# Patient Record
Sex: MALE | Race: WHITE | NOT HISPANIC OR LATINO | Employment: STUDENT | URBAN - METROPOLITAN AREA
[De-identification: names, ages, dates, MRNs, and addresses within clinical notes are randomized per-mention and may not be internally consistent; named-entity substitution may affect disease eponyms.]

---
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
## 2025-05-03 ENCOUNTER — HOSPITAL ENCOUNTER (EMERGENCY)
Facility: HOSPITAL | Age: 20
Discharge: HOME | End: 2025-05-03
Attending: EMERGENCY MEDICINE
Payer: COMMERCIAL

## 2025-05-03 ENCOUNTER — APPOINTMENT (OUTPATIENT)
Dept: GENERAL RADIOLOGY | Facility: HOSPITAL | Age: 20
End: 2025-05-03
Payer: COMMERCIAL

## 2025-05-03 VITALS
TEMPERATURE: 98.2 F | DIASTOLIC BLOOD PRESSURE: 65 MMHG | SYSTOLIC BLOOD PRESSURE: 123 MMHG | OXYGEN SATURATION: 99 % | HEART RATE: 67 BPM | RESPIRATION RATE: 18 BRPM

## 2025-05-03 DIAGNOSIS — S42.452A CLOSED FRACTURE OF CAPITELLUM OF DISTAL HUMERUS, LEFT, INITIAL ENCOUNTER: Primary | ICD-10-CM

## 2025-05-03 PROCEDURE — 99283 EMERGENCY DEPT VISIT LOW MDM: CPT

## 2025-05-03 PROCEDURE — 73080 X-RAY EXAM OF ELBOW: CPT

## 2025-05-03 PROCEDURE — 73110 X-RAY EXAM OF WRIST: CPT

## 2025-05-03 RX ORDER — IBUPROFEN 400 MG/1
800 TABLET, FILM COATED ORAL ONCE
Status: COMPLETED | OUTPATIENT
Start: 2025-05-03 | End: 2025-05-03

## 2025-05-03 RX ORDER — CYCLOBENZAPRINE HCL 10 MG
10 TABLET ORAL ONCE
Status: COMPLETED | OUTPATIENT
Start: 2025-05-03 | End: 2025-05-03

## 2025-05-03 RX ORDER — HYDROCODONE BITARTRATE AND ACETAMINOPHEN 5; 325 MG/1; MG/1
1 TABLET ORAL EVERY 6 HOURS PRN
Qty: 12 TABLET | Refills: 0 | Status: SHIPPED | OUTPATIENT
Start: 2025-05-03 | End: 2025-05-03

## 2025-05-03 RX ORDER — CYCLOBENZAPRINE HCL 10 MG
10 TABLET ORAL 3 TIMES DAILY
Qty: 20 TABLET | Refills: 0 | Status: SHIPPED | OUTPATIENT
Start: 2025-05-03

## 2025-05-03 RX ORDER — HYDROCODONE BITARTRATE AND ACETAMINOPHEN 5; 325 MG/1; MG/1
1 TABLET ORAL EVERY 6 HOURS PRN
Qty: 12 TABLET | Refills: 0 | Status: SHIPPED | OUTPATIENT
Start: 2025-05-03

## 2025-05-03 RX ADMIN — CYCLOBENZAPRINE HYDROCHLORIDE 10 MG: 10 TABLET, FILM COATED ORAL at 15:36

## 2025-05-03 RX ADMIN — IBUPROFEN 800 MG: 400 TABLET ORAL at 15:36
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
## 2025-05-03 NOTE — DISCHARGE INSTRUCTIONS
Your x-ray shows that you do have a fracture of your distal humerus.  Please keep your temporary splint clean and dry until you follow-up with Ortho.  Their office will call you to schedule follow-up appointment.  I have sent muscle relaxers and hydrocodone to the pharmacy.  You can take ibuprofen in addition.

## 2025-05-07 ENCOUNTER — TELEPHONE (OUTPATIENT)
Dept: ORTHOPEDIC SURGERY | Facility: CLINIC | Age: 20
End: 2025-05-07
Payer: COMMERCIAL

## 2025-05-08 ENCOUNTER — TELEPHONE (OUTPATIENT)
Dept: ORTHOPEDIC SURGERY | Facility: CLINIC | Age: 20
End: 2025-05-08
Payer: COMMERCIAL

## 2025-05-15 ENCOUNTER — HOSPITAL ENCOUNTER (OUTPATIENT)
Dept: CT IMAGING | Facility: HOSPITAL | Age: 20
Discharge: HOME | End: 2025-05-15
Admitting: ORTHOPAEDIC SURGERY
Payer: COMMERCIAL

## 2025-05-15 ENCOUNTER — OFFICE VISIT (OUTPATIENT)
Dept: ORTHOPEDIC SURGERY | Facility: CLINIC | Age: 20
End: 2025-05-15
Payer: COMMERCIAL

## 2025-05-15 VITALS — WEIGHT: 145 LBS | BODY MASS INDEX: 21.98 KG/M2 | HEIGHT: 68 IN

## 2025-05-15 DIAGNOSIS — S42.402A CLOSED FRACTURE OF LEFT ELBOW, INITIAL ENCOUNTER: Primary | ICD-10-CM

## 2025-05-15 PROCEDURE — 73200 CT UPPER EXTREMITY W/O DYE: CPT
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
## 2025-05-16 ENCOUNTER — OFFICE VISIT (OUTPATIENT)
Dept: ORTHOPEDIC SURGERY | Facility: CLINIC | Age: 20
End: 2025-05-16
Payer: COMMERCIAL

## 2025-05-16 VITALS — BODY MASS INDEX: 21.98 KG/M2 | WEIGHT: 145 LBS | HEIGHT: 68 IN

## 2025-05-16 DIAGNOSIS — S42.402A CLOSED FRACTURE OF LEFT ELBOW, INITIAL ENCOUNTER: Primary | ICD-10-CM
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
## 2025-06-13 PROBLEM — S42.402A CLOSED FRACTURE OF LEFT ELBOW: Status: ACTIVE | Noted: 2025-06-13

## 2025-06-19 ENCOUNTER — OFFICE VISIT (OUTPATIENT)
Dept: ORTHOPEDIC SURGERY | Facility: CLINIC | Age: 20
End: 2025-06-19
Payer: COMMERCIAL

## 2025-06-19 VITALS
DIASTOLIC BLOOD PRESSURE: 75 MMHG | HEIGHT: 68 IN | SYSTOLIC BLOOD PRESSURE: 138 MMHG | BODY MASS INDEX: 21.98 KG/M2 | OXYGEN SATURATION: 99 % | HEART RATE: 64 BPM | WEIGHT: 145 LBS

## 2025-06-19 DIAGNOSIS — S42.402A CLOSED FRACTURE OF LEFT ELBOW, INITIAL ENCOUNTER: Primary | ICD-10-CM

## 2025-06-19 DIAGNOSIS — M25.522 LEFT ELBOW PAIN: ICD-10-CM

## 2025-06-19 NOTE — PROGRESS NOTES
"Chief Complaint  Follow-up of the Left Elbow    Subjective          History of Present Illness      Adam Nathan is a 19 y.o. male  presents to [facility] for     Patient presents for follow-up evaluation of distal humerus fracture.  Dr. Lovelace saw him at last visit reviewed his CT and x-rays with him and placed him into a brace.  Patient presents without his hinged elbow brace he states that he did not go to any therapy he states that he has been using his arm without the brace and he denies pain denies swelling he states he has no concern for how his elbow feels denies need for pain medicine or NSAIDs, no new complaints.      No Known Allergies     Social History     Socioeconomic History    Marital status: Single   Tobacco Use    Smoking status: Never    Smokeless tobacco: Never   Vaping Use    Vaping status: Never Used   Substance and Sexual Activity    Alcohol use: Never    Drug use: Never        REVIEW OF SYSTEMS    Constitutional: Awake alert and oriented x3, no acute distress, denies fevers, chills, weight loss  Respiratory: No respiratory distress  Vascular: Brisk cap refill, Intact distal pulses, No cyanosis, compartments soft with no signs or symptoms of compartment syndrome or DVT.   Cardiovascular: Denies chest pain, shortness of breath  Skin: Denies rashes, acute skin changes  Neurologic: Denies headache, loss of consciousness  MSK: Left elbow pain      Objective   Vital Signs:   /75   Pulse 64   Ht 172.7 cm (67.99\")   Wt 65.8 kg (145 lb)   SpO2 99%   BMI 22.05 kg/m²     Body mass index is 22.05 kg/m².    Physical Exam       Left elbow: Nontender to palpation no pain with range of motion no pain with resisted range of motion 5 out of 5 strength, full extension full flexion, full supination and pronation, neurovascularly intact      Procedures    Imaging Results (Most Recent)       Procedure Component Value Units Date/Time    XR Elbow 2 View Left - " Preliminary [[id number]] Resulted: 06/19/25 1425     Updated: 06/19/25 1425    This result has not been signed. Information might be incomplete.      Narrative:      View:AP/Lateral view(s)  Site: Left elbow Indication: Left elbow pain  Study: X-rays ordered, taken in the office, and reviewed today  X-ray:.Good healing of distal humerus fracture, fracture line remains   stable with callus formation, no increased displacement or angulation  Comparative data: Previous studies                     Assessment and Plan    Diagnoses and all orders for this visit:    1. Closed fracture of left elbow, initial encounter (Primary)    2. Left elbow pain  -     XR Elbow 2 View Left        Reviewed x-rays with the patient discussed diagnosis and treatment options with him and his father they were advised patient can continue activity as tolerated follow-up as needed.    Call or return if worsening symptoms.    Follow Up   Return if symptoms worsen or fail to improve.  Patient was given instructions and counseling regarding his condition or for health maintenance advice. Please see specific information pulled into the AVS if appropriate.       EMR Dragon/Transcription disclaimer:  Part of this note may be an electronic transcription/translation of spoken language to printed text using the Dragon Dictation System